# Patient Record
Sex: FEMALE | Race: WHITE | NOT HISPANIC OR LATINO | Employment: FULL TIME | URBAN - METROPOLITAN AREA
[De-identification: names, ages, dates, MRNs, and addresses within clinical notes are randomized per-mention and may not be internally consistent; named-entity substitution may affect disease eponyms.]

---

## 2021-11-02 ENCOUNTER — OFFICE VISIT (OUTPATIENT)
Dept: OBSTETRICS AND GYNECOLOGY | Facility: CLINIC | Age: 31
End: 2021-11-02
Payer: OTHER GOVERNMENT

## 2021-11-02 VITALS — WEIGHT: 132.25 LBS | DIASTOLIC BLOOD PRESSURE: 72 MMHG | SYSTOLIC BLOOD PRESSURE: 120 MMHG

## 2021-11-02 DIAGNOSIS — Z01.419 WELL WOMAN EXAM WITH ROUTINE GYNECOLOGICAL EXAM: Primary | ICD-10-CM

## 2021-11-02 DIAGNOSIS — R00.2 PALPITATIONS: ICD-10-CM

## 2021-11-02 DIAGNOSIS — Z12.4 CERVICAL CANCER SCREENING: ICD-10-CM

## 2021-11-02 PROCEDURE — 99385 PREV VISIT NEW AGE 18-39: CPT | Mod: S$PBB,,, | Performed by: OBSTETRICS & GYNECOLOGY

## 2021-11-02 PROCEDURE — 87624 HPV HI-RISK TYP POOLED RSLT: CPT | Performed by: OBSTETRICS & GYNECOLOGY

## 2021-11-02 PROCEDURE — 99385 PR PREVENTIVE VISIT,NEW,18-39: ICD-10-PCS | Mod: S$PBB,,, | Performed by: OBSTETRICS & GYNECOLOGY

## 2021-11-02 PROCEDURE — 99999 PR PBB SHADOW E&M-NEW PATIENT-LVL III: CPT | Mod: PBBFAC,,, | Performed by: OBSTETRICS & GYNECOLOGY

## 2021-11-02 PROCEDURE — 99203 OFFICE O/P NEW LOW 30 MIN: CPT | Mod: PBBFAC | Performed by: OBSTETRICS & GYNECOLOGY

## 2021-11-02 PROCEDURE — 88175 CYTOPATH C/V AUTO FLUID REDO: CPT | Performed by: OBSTETRICS & GYNECOLOGY

## 2021-11-02 PROCEDURE — 99999 PR PBB SHADOW E&M-NEW PATIENT-LVL III: ICD-10-PCS | Mod: PBBFAC,,, | Performed by: OBSTETRICS & GYNECOLOGY

## 2021-11-02 RX ORDER — LEVONORGESTREL AND ETHINYL ESTRADIOL 0.15-0.03
1 KIT ORAL DAILY
COMMUNITY
End: 2021-11-02 | Stop reason: SDUPTHER

## 2021-11-02 RX ORDER — LEVONORGESTREL AND ETHINYL ESTRADIOL 0.15-0.03
1 KIT ORAL DAILY
COMMUNITY
Start: 2021-08-09 | End: 2022-02-23

## 2021-11-02 RX ORDER — DEXTROMETHORPHAN POLISTIREX 30 MG/5ML
60 SUSPENSION ORAL
COMMUNITY
End: 2021-11-02

## 2021-11-03 ENCOUNTER — HOSPITAL ENCOUNTER (OUTPATIENT)
Dept: CARDIOLOGY | Facility: HOSPITAL | Age: 31
Discharge: HOME | End: 2021-11-03
Attending: OBSTETRICS & GYNECOLOGY
Payer: OTHER GOVERNMENT

## 2021-11-03 DIAGNOSIS — R00.2 PALPITATIONS: ICD-10-CM

## 2021-11-03 PROCEDURE — 93010 EKG 12-LEAD: ICD-10-PCS | Mod: ,,, | Performed by: INTERNAL MEDICINE

## 2021-11-03 PROCEDURE — 93005 ELECTROCARDIOGRAM TRACING: CPT

## 2021-11-03 PROCEDURE — 93010 ELECTROCARDIOGRAM REPORT: CPT | Mod: ,,, | Performed by: INTERNAL MEDICINE

## 2021-11-05 ENCOUNTER — OFFICE VISIT (OUTPATIENT)
Dept: CARDIOLOGY | Facility: CLINIC | Age: 31
End: 2021-11-05
Payer: OTHER GOVERNMENT

## 2021-11-05 VITALS
HEIGHT: 65 IN | BODY MASS INDEX: 22.03 KG/M2 | DIASTOLIC BLOOD PRESSURE: 78 MMHG | WEIGHT: 132.25 LBS | HEART RATE: 76 BPM | SYSTOLIC BLOOD PRESSURE: 118 MMHG | OXYGEN SATURATION: 99 %

## 2021-11-05 DIAGNOSIS — R00.2 PALPITATIONS: ICD-10-CM

## 2021-11-05 DIAGNOSIS — R06.02 SHORTNESS OF BREATH: Primary | ICD-10-CM

## 2021-11-05 PROCEDURE — 99213 OFFICE O/P EST LOW 20 MIN: CPT | Mod: PBBFAC | Performed by: INTERNAL MEDICINE

## 2021-11-05 PROCEDURE — 99999 PR PBB SHADOW E&M-EST. PATIENT-LVL III: ICD-10-PCS | Mod: PBBFAC,,, | Performed by: INTERNAL MEDICINE

## 2021-11-05 PROCEDURE — 99203 PR OFFICE/OUTPT VISIT, NEW, LEVL III, 30-44 MIN: ICD-10-PCS | Mod: S$PBB,,, | Performed by: INTERNAL MEDICINE

## 2021-11-05 PROCEDURE — 99203 OFFICE O/P NEW LOW 30 MIN: CPT | Mod: S$PBB,,, | Performed by: INTERNAL MEDICINE

## 2021-11-05 PROCEDURE — 99999 PR PBB SHADOW E&M-EST. PATIENT-LVL III: CPT | Mod: PBBFAC,,, | Performed by: INTERNAL MEDICINE

## 2021-11-09 LAB
FINAL PATHOLOGIC DIAGNOSIS: NORMAL
Lab: NORMAL

## 2021-11-11 ENCOUNTER — LAB VISIT (OUTPATIENT)
Dept: LAB | Facility: HOSPITAL | Age: 31
End: 2021-11-11
Attending: INTERNAL MEDICINE
Payer: OTHER GOVERNMENT

## 2021-11-11 DIAGNOSIS — R06.02 SHORTNESS OF BREATH: ICD-10-CM

## 2021-11-11 DIAGNOSIS — R00.2 PALPITATIONS: ICD-10-CM

## 2021-11-11 LAB
ALBUMIN SERPL BCP-MCNC: 3.9 G/DL (ref 3.5–5.2)
ALP SERPL-CCNC: 50 U/L (ref 55–135)
ALT SERPL W/O P-5'-P-CCNC: 18 U/L (ref 10–44)
ANION GAP SERPL CALC-SCNC: 10 MMOL/L (ref 8–16)
AST SERPL-CCNC: 18 U/L (ref 10–40)
BASOPHILS # BLD AUTO: 0.04 K/UL (ref 0–0.2)
BASOPHILS NFR BLD: 0.8 % (ref 0–1.9)
BILIRUB SERPL-MCNC: 0.6 MG/DL (ref 0.1–1)
BUN SERPL-MCNC: 12 MG/DL (ref 6–20)
CALCIUM SERPL-MCNC: 9.6 MG/DL (ref 8.7–10.5)
CHLORIDE SERPL-SCNC: 106 MMOL/L (ref 95–110)
CHOLEST SERPL-MCNC: 177 MG/DL (ref 120–199)
CHOLEST/HDLC SERPL: 3.6 {RATIO} (ref 2–5)
CO2 SERPL-SCNC: 24 MMOL/L (ref 23–29)
CREAT SERPL-MCNC: 0.8 MG/DL (ref 0.5–1.4)
DIFFERENTIAL METHOD: NORMAL
EOSINOPHIL # BLD AUTO: 0.1 K/UL (ref 0–0.5)
EOSINOPHIL NFR BLD: 1.1 % (ref 0–8)
ERYTHROCYTE [DISTWIDTH] IN BLOOD BY AUTOMATED COUNT: 11.9 % (ref 11.5–14.5)
EST. GFR  (AFRICAN AMERICAN): >60 ML/MIN/1.73 M^2
EST. GFR  (NON AFRICAN AMERICAN): >60 ML/MIN/1.73 M^2
GLUCOSE SERPL-MCNC: 81 MG/DL (ref 70–110)
HCT VFR BLD AUTO: 39.6 % (ref 37–48.5)
HDLC SERPL-MCNC: 49 MG/DL (ref 40–75)
HDLC SERPL: 27.7 % (ref 20–50)
HGB BLD-MCNC: 13.1 G/DL (ref 12–16)
IMM GRANULOCYTES # BLD AUTO: 0.01 K/UL (ref 0–0.04)
IMM GRANULOCYTES NFR BLD AUTO: 0.2 % (ref 0–0.5)
LDLC SERPL CALC-MCNC: 108.8 MG/DL (ref 63–159)
LYMPHOCYTES # BLD AUTO: 1.9 K/UL (ref 1–4.8)
LYMPHOCYTES NFR BLD: 39.6 % (ref 18–48)
MCH RBC QN AUTO: 30.3 PG (ref 27–31)
MCHC RBC AUTO-ENTMCNC: 33.1 G/DL (ref 32–36)
MCV RBC AUTO: 92 FL (ref 82–98)
MONOCYTES # BLD AUTO: 0.5 K/UL (ref 0.3–1)
MONOCYTES NFR BLD: 9.7 % (ref 4–15)
NEUTROPHILS # BLD AUTO: 2.3 K/UL (ref 1.8–7.7)
NEUTROPHILS NFR BLD: 48.6 % (ref 38–73)
NONHDLC SERPL-MCNC: 128 MG/DL
NRBC BLD-RTO: 0 /100 WBC
PLATELET # BLD AUTO: 273 K/UL (ref 150–450)
PMV BLD AUTO: 10.5 FL (ref 9.2–12.9)
POTASSIUM SERPL-SCNC: 4.2 MMOL/L (ref 3.5–5.1)
PROT SERPL-MCNC: 7.1 G/DL (ref 6–8.4)
RBC # BLD AUTO: 4.33 M/UL (ref 4–5.4)
SODIUM SERPL-SCNC: 140 MMOL/L (ref 136–145)
TRIGL SERPL-MCNC: 96 MG/DL (ref 30–150)
TSH SERPL DL<=0.005 MIU/L-ACNC: 3.29 UIU/ML (ref 0.4–4)
WBC # BLD AUTO: 4.75 K/UL (ref 3.9–12.7)

## 2021-11-11 PROCEDURE — 85025 COMPLETE CBC W/AUTO DIFF WBC: CPT | Performed by: INTERNAL MEDICINE

## 2021-11-11 PROCEDURE — 80061 LIPID PANEL: CPT | Performed by: INTERNAL MEDICINE

## 2021-11-11 PROCEDURE — 80053 COMPREHEN METABOLIC PANEL: CPT | Performed by: INTERNAL MEDICINE

## 2021-11-11 PROCEDURE — 36415 COLL VENOUS BLD VENIPUNCTURE: CPT | Performed by: INTERNAL MEDICINE

## 2021-11-11 PROCEDURE — 84443 ASSAY THYROID STIM HORMONE: CPT | Performed by: INTERNAL MEDICINE

## 2021-11-12 ENCOUNTER — TELEPHONE (OUTPATIENT)
Dept: CARDIOLOGY | Facility: CLINIC | Age: 31
End: 2021-11-12
Payer: OTHER GOVERNMENT

## 2021-11-12 LAB
HPV HR 12 DNA SPEC QL NAA+PROBE: NEGATIVE
HPV16 AG SPEC QL: NEGATIVE
HPV18 DNA SPEC QL NAA+PROBE: NEGATIVE

## 2021-11-17 ENCOUNTER — HOSPITAL ENCOUNTER (OUTPATIENT)
Dept: CARDIOLOGY | Facility: HOSPITAL | Age: 31
Discharge: HOME | End: 2021-11-17
Attending: INTERNAL MEDICINE
Payer: OTHER GOVERNMENT

## 2021-11-17 VITALS
HEIGHT: 65 IN | SYSTOLIC BLOOD PRESSURE: 124 MMHG | BODY MASS INDEX: 21.99 KG/M2 | WEIGHT: 132 LBS | DIASTOLIC BLOOD PRESSURE: 80 MMHG | HEART RATE: 76 BPM

## 2021-11-17 VITALS
HEART RATE: 78 BPM | HEIGHT: 65 IN | BODY MASS INDEX: 21.99 KG/M2 | WEIGHT: 132 LBS | DIASTOLIC BLOOD PRESSURE: 78 MMHG | SYSTOLIC BLOOD PRESSURE: 118 MMHG

## 2021-11-17 DIAGNOSIS — R00.2 PALPITATIONS: ICD-10-CM

## 2021-11-17 DIAGNOSIS — R06.02 SHORTNESS OF BREATH: ICD-10-CM

## 2021-11-17 PROCEDURE — 93306 TTE W/DOPPLER COMPLETE: CPT | Mod: 26,,, | Performed by: STUDENT IN AN ORGANIZED HEALTH CARE EDUCATION/TRAINING PROGRAM

## 2021-11-17 PROCEDURE — 93227 XTRNL ECG REC<48 HR R&I: CPT | Mod: ,,, | Performed by: INTERNAL MEDICINE

## 2021-11-17 PROCEDURE — 93226 XTRNL ECG REC<48 HR SCAN A/R: CPT

## 2021-11-17 PROCEDURE — 93306 ECHO (CUPID ONLY): ICD-10-PCS | Mod: 26,,, | Performed by: STUDENT IN AN ORGANIZED HEALTH CARE EDUCATION/TRAINING PROGRAM

## 2021-11-17 PROCEDURE — 93017 CV STRESS TEST TRACING ONLY: CPT

## 2021-11-17 PROCEDURE — 93016 CV STRESS TEST SUPVJ ONLY: CPT | Mod: ,,, | Performed by: STUDENT IN AN ORGANIZED HEALTH CARE EDUCATION/TRAINING PROGRAM

## 2021-11-17 PROCEDURE — 93016 EXERCISE STRESS - EKG (CUPID ONLY): ICD-10-PCS | Mod: ,,, | Performed by: STUDENT IN AN ORGANIZED HEALTH CARE EDUCATION/TRAINING PROGRAM

## 2021-11-17 PROCEDURE — 93018 EXERCISE STRESS - EKG (CUPID ONLY): ICD-10-PCS | Mod: ,,, | Performed by: STUDENT IN AN ORGANIZED HEALTH CARE EDUCATION/TRAINING PROGRAM

## 2021-11-17 PROCEDURE — 93018 CV STRESS TEST I&R ONLY: CPT | Mod: ,,, | Performed by: STUDENT IN AN ORGANIZED HEALTH CARE EDUCATION/TRAINING PROGRAM

## 2021-11-17 PROCEDURE — 93306 TTE W/DOPPLER COMPLETE: CPT

## 2021-11-17 PROCEDURE — 93227 HOLTER MONITOR - 24 HOUR (CUPID ONLY): ICD-10-PCS | Mod: ,,, | Performed by: INTERNAL MEDICINE

## 2021-11-18 LAB
AORTIC ROOT ANNULUS: 2.49 CM
ASCENDING AORTA: 2.87 CM
AV INDEX (PROSTH): 0.73
AV MEAN GRADIENT: 3 MMHG
AV PEAK GRADIENT: 5 MMHG
AV VALVE AREA: 2.7 CM2
AV VELOCITY RATIO: 0.75
BSA FOR ECHO PROCEDURE: 1.66 M2
CV ECHO LV RWT: 0.31 CM
DOP CALC AO PEAK VEL: 1.14 M/S
DOP CALC AO VTI: 24 CM
DOP CALC LVOT AREA: 3.7 CM2
DOP CALC LVOT DIAMETER: 2.18 CM
DOP CALC LVOT PEAK VEL: 0.86 M/S
DOP CALC LVOT STROKE VOLUME: 64.91 CM3
DOP CALC RVOT PEAK VEL: 0.99 M/S
DOP CALC RVOT VTI: 22.1 CM
DOP CALCLVOT PEAK VEL VTI: 17.4 CM
E WAVE DECELERATION TIME: 148.97 MSEC
E/A RATIO: 1.27
E/E' RATIO: 7.12 M/S
ECHO EF ESTIMATED: 70 %
ECHO LV POSTERIOR WALL: 0.73 CM (ref 0.6–1.1)
EJECTION FRACTION: 60 %
FRACTIONAL SHORTENING: 40 % (ref 28–44)
INTERVENTRICULAR SEPTUM: 0.6 CM (ref 0.6–1.1)
IVC DIAMETER: 1.68 CM
IVRT: 65.65 MSEC
LA MAJOR: 4.17 CM
LA WIDTH: 2.63 CM
LEFT ATRIUM SIZE: 3.12 CM
LEFT ATRIUM VOLUME INDEX MOD: 4.8 ML/M2
LEFT ATRIUM VOLUME MOD: 7.99 CM3
LEFT INTERNAL DIMENSION IN SYSTOLE: 2.82 CM (ref 2.1–4)
LEFT VENTRICLE DIASTOLIC VOLUME INDEX: 61.13 ML/M2
LEFT VENTRICLE DIASTOLIC VOLUME: 101.47 ML
LEFT VENTRICLE MASS INDEX: 58 G/M2
LEFT VENTRICLE SYSTOLIC VOLUME INDEX: 18.1 ML/M2
LEFT VENTRICLE SYSTOLIC VOLUME: 30.03 ML
LEFT VENTRICULAR INTERNAL DIMENSION IN DIASTOLE: 4.68 CM (ref 3.5–6)
LEFT VENTRICULAR MASS: 95.93 G
LV LATERAL E/E' RATIO: 6.85 M/S
LV SEPTAL E/E' RATIO: 7.42 M/S
LVOT MG: 1.5 MMHG
LVOT MV: 0.57 CM/S
MV PEAK A VEL: 0.7 M/S
MV PEAK E VEL: 0.89 M/S
PISA TR MAX VEL: 2.14 M/S
PULM VEIN S/D RATIO: 1.13
PV MEAN GRADIENT: 2.35 MMHG
PV PEAK D VEL: 0.61 M/S
PV PEAK S VEL: 0.69 M/S
PV PEAK VELOCITY: 1.05 CM/S
RA MAJOR: 4.2 CM
RA PRESSURE: 8 MMHG
RA WIDTH: 3.1 CM
RIGHT VENTRICULAR END-DIASTOLIC DIMENSION: 3.43 CM
SINUS: 2.8 CM
STJ: 2.81 CM
TDI LATERAL: 0.13 M/S
TDI SEPTAL: 0.12 M/S
TDI: 0.13 M/S
TR MAX PG: 18 MMHG
TRICUSPID ANNULAR PLANE SYSTOLIC EXCURSION: 1.82 CM
TV REST PULMONARY ARTERY PRESSURE: 26 MMHG

## 2021-11-19 LAB
CV STRESS BASE HR: 76 BPM
DIASTOLIC BLOOD PRESSURE: 80 MMHG
OHS CV CPX 1 MINUTE RECOVERY HEART RATE: 136 BPM
OHS CV CPX 85 PERCENT MAX PREDICTED HEART RATE MALE: 150
OHS CV CPX ESTIMATED METS: 7
OHS CV CPX MAX PREDICTED HEART RATE: 177
OHS CV CPX PATIENT IS FEMALE: 1
OHS CV CPX PATIENT IS MALE: 0
OHS CV CPX PEAK DIASTOLIC BLOOD PRESSURE: 70 MMHG
OHS CV CPX PEAK HEAR RATE: 164 BPM
OHS CV CPX PEAK RATE PRESSURE PRODUCT: NORMAL
OHS CV CPX PEAK SYSTOLIC BLOOD PRESSURE: 155 MMHG
OHS CV CPX PERCENT MAX PREDICTED HEART RATE ACHIEVED: 93
OHS CV CPX RATE PRESSURE PRODUCT PRESENTING: 9424
STRESS ECHO POST EXERCISE DUR MIN: 7 MINUTES
STRESS ECHO POST EXERCISE DUR SEC: 0 SECONDS
SYSTOLIC BLOOD PRESSURE: 124 MMHG

## 2021-11-21 LAB
OHS CV EVENT MONITOR DAY: 1
OHS CV HOLTER LENGTH DECIMAL HOURS: 48
OHS CV HOLTER LENGTH HOURS: 24
OHS CV HOLTER LENGTH MINUTES: 0
OHS CV HOLTER SINUS AVERAGE HR: 82
OHS CV HOLTER SINUS MAX HR: 135
OHS CV HOLTER SINUS MIN HR: 57

## 2021-11-22 ENCOUNTER — TELEPHONE (OUTPATIENT)
Dept: CARDIOLOGY | Facility: CLINIC | Age: 31
End: 2021-11-22
Payer: OTHER GOVERNMENT

## 2021-11-22 DIAGNOSIS — R06.02 SHORTNESS OF BREATH: Primary | ICD-10-CM

## 2022-01-05 ENCOUNTER — LAB VISIT (OUTPATIENT)
Dept: PRIMARY CARE CLINIC | Facility: OTHER | Age: 32
End: 2022-01-05
Attending: INTERNAL MEDICINE

## 2022-01-05 ENCOUNTER — PATIENT MESSAGE (OUTPATIENT)
Dept: ADMINISTRATIVE | Facility: OTHER | Age: 32
End: 2022-01-05
Payer: OTHER GOVERNMENT

## 2022-01-05 DIAGNOSIS — R05.9 COUGH: ICD-10-CM

## 2022-01-05 DIAGNOSIS — R50.9 FEVER: ICD-10-CM

## 2022-01-05 PROCEDURE — U0003 INFECTIOUS AGENT DETECTION BY NUCLEIC ACID (DNA OR RNA); SEVERE ACUTE RESPIRATORY SYNDROME CORONAVIRUS 2 (SARS-COV-2) (CORONAVIRUS DISEASE [COVID-19]), AMPLIFIED PROBE TECHNIQUE, MAKING USE OF HIGH THROUGHPUT TECHNOLOGIES AS DESCRIBED BY CMS-2020-01-R: HCPCS | Performed by: INTERNAL MEDICINE

## 2022-01-06 ENCOUNTER — PATIENT MESSAGE (OUTPATIENT)
Dept: ADMINISTRATIVE | Facility: OTHER | Age: 32
End: 2022-01-06
Payer: OTHER GOVERNMENT

## 2022-01-08 LAB — SARS-COV-2 RNA RESP QL NAA+PROBE: DETECTED

## 2022-01-10 ENCOUNTER — PATIENT MESSAGE (OUTPATIENT)
Dept: OBSTETRICS AND GYNECOLOGY | Facility: CLINIC | Age: 32
End: 2022-01-10
Payer: OTHER GOVERNMENT

## 2022-02-23 ENCOUNTER — LAB VISIT (OUTPATIENT)
Dept: LAB | Facility: HOSPITAL | Age: 32
End: 2022-02-23
Attending: NURSE PRACTITIONER
Payer: OTHER GOVERNMENT

## 2022-02-23 ENCOUNTER — OFFICE VISIT (OUTPATIENT)
Dept: OBSTETRICS AND GYNECOLOGY | Facility: CLINIC | Age: 32
End: 2022-02-23
Payer: OTHER GOVERNMENT

## 2022-02-23 VITALS
BODY MASS INDEX: 22.89 KG/M2 | SYSTOLIC BLOOD PRESSURE: 118 MMHG | DIASTOLIC BLOOD PRESSURE: 68 MMHG | WEIGHT: 137.56 LBS

## 2022-02-23 DIAGNOSIS — Z32.00 POSSIBLE PREGNANCY: ICD-10-CM

## 2022-02-23 DIAGNOSIS — Z32.00 POSSIBLE PREGNANCY: Primary | ICD-10-CM

## 2022-02-23 LAB
ABO + RH BLD: NORMAL
B-HCG UR QL: POSITIVE
BASOPHILS # BLD AUTO: 0.04 K/UL (ref 0–0.2)
BASOPHILS NFR BLD: 0.7 % (ref 0–1.9)
BILIRUB UR QL STRIP: NEGATIVE
BLD GP AB SCN CELLS X3 SERPL QL: NORMAL
CLARITY UR: CLEAR
COLOR UR: ABNORMAL
CTP QC/QA: YES
DIFFERENTIAL METHOD: NORMAL
EOSINOPHIL # BLD AUTO: 0.1 K/UL (ref 0–0.5)
EOSINOPHIL NFR BLD: 1 % (ref 0–8)
ERYTHROCYTE [DISTWIDTH] IN BLOOD BY AUTOMATED COUNT: 12.4 % (ref 11.5–14.5)
GLUCOSE UR QL STRIP: NEGATIVE
HCT VFR BLD AUTO: 38.4 % (ref 37–48.5)
HGB BLD-MCNC: 12.6 G/DL (ref 12–16)
HGB UR QL STRIP: NEGATIVE
IMM GRANULOCYTES # BLD AUTO: 0.02 K/UL (ref 0–0.04)
IMM GRANULOCYTES NFR BLD AUTO: 0.3 % (ref 0–0.5)
KETONES UR QL STRIP: NEGATIVE
LEUKOCYTE ESTERASE UR QL STRIP: ABNORMAL
LYMPHOCYTES # BLD AUTO: 2 K/UL (ref 1–4.8)
LYMPHOCYTES NFR BLD: 33.7 % (ref 18–48)
MCH RBC QN AUTO: 30.8 PG (ref 27–31)
MCHC RBC AUTO-ENTMCNC: 32.8 G/DL (ref 32–36)
MCV RBC AUTO: 94 FL (ref 82–98)
MICROSCOPIC COMMENT: ABNORMAL
MONOCYTES # BLD AUTO: 0.5 K/UL (ref 0.3–1)
MONOCYTES NFR BLD: 8.1 % (ref 4–15)
NEUTROPHILS # BLD AUTO: 3.4 K/UL (ref 1.8–7.7)
NEUTROPHILS NFR BLD: 56.2 % (ref 38–73)
NITRITE UR QL STRIP: NEGATIVE
NRBC BLD-RTO: 0 /100 WBC
PH UR STRIP: 7 [PH] (ref 5–8)
PLATELET # BLD AUTO: 292 K/UL (ref 150–450)
PMV BLD AUTO: 10 FL (ref 9.2–12.9)
PROT UR QL STRIP: NEGATIVE
RBC # BLD AUTO: 4.09 M/UL (ref 4–5.4)
SP GR UR STRIP: <=1.005 (ref 1–1.03)
URN SPEC COLLECT METH UR: ABNORMAL
WBC # BLD AUTO: 5.96 K/UL (ref 3.9–12.7)
WBC #/AREA URNS HPF: 8 /HPF (ref 0–5)

## 2022-02-23 PROCEDURE — 86850 RBC ANTIBODY SCREEN: CPT | Performed by: NURSE PRACTITIONER

## 2022-02-23 PROCEDURE — 86592 SYPHILIS TEST NON-TREP QUAL: CPT | Performed by: NURSE PRACTITIONER

## 2022-02-23 PROCEDURE — 99213 OFFICE O/P EST LOW 20 MIN: CPT | Mod: PBBFAC | Performed by: NURSE PRACTITIONER

## 2022-02-23 PROCEDURE — 99999 PR PBB SHADOW E&M-EST. PATIENT-LVL III: CPT | Mod: PBBFAC,,, | Performed by: NURSE PRACTITIONER

## 2022-02-23 PROCEDURE — 87591 N.GONORRHOEAE DNA AMP PROB: CPT | Performed by: NURSE PRACTITIONER

## 2022-02-23 PROCEDURE — 81025 URINE PREGNANCY TEST: CPT | Mod: PBBFAC | Performed by: NURSE PRACTITIONER

## 2022-02-23 PROCEDURE — 36415 COLL VENOUS BLD VENIPUNCTURE: CPT | Performed by: NURSE PRACTITIONER

## 2022-02-23 PROCEDURE — 86762 RUBELLA ANTIBODY: CPT | Performed by: NURSE PRACTITIONER

## 2022-02-23 PROCEDURE — 87491 CHLMYD TRACH DNA AMP PROBE: CPT | Performed by: NURSE PRACTITIONER

## 2022-02-23 PROCEDURE — 85025 COMPLETE CBC W/AUTO DIFF WBC: CPT | Performed by: NURSE PRACTITIONER

## 2022-02-23 PROCEDURE — 87389 HIV-1 AG W/HIV-1&-2 AB AG IA: CPT | Performed by: NURSE PRACTITIONER

## 2022-02-23 PROCEDURE — 99999 PR PBB SHADOW E&M-EST. PATIENT-LVL III: ICD-10-PCS | Mod: PBBFAC,,, | Performed by: NURSE PRACTITIONER

## 2022-02-23 PROCEDURE — 81000 URINALYSIS NONAUTO W/SCOPE: CPT | Performed by: NURSE PRACTITIONER

## 2022-02-23 PROCEDURE — 80074 ACUTE HEPATITIS PANEL: CPT | Performed by: NURSE PRACTITIONER

## 2022-02-23 NOTE — PROGRESS NOTES
CHIEF COMPLAINT:   Patient presents with      initial OB        HISTORY OF PRESENT ILLNESS  Arely Shelton 31 y.o.  presents for initial OB  The patient has no complaints today.  No nausea or vomiting. No bleeding or pain.  Pregnancy was planned.  Partner is supportive of pregnancy.  Lives at home with .  1 dog in the home.  Works at [employer].  Denies domestic abuse.  Denies chemical/pesticide/radiation exposure.    OB history:  OB History    Para Term  AB Living   0 0 0 0 0 0   SAB IAB Ectopic Multiple Live Births   0 0 0 0 0         LMP: Patient's last menstrual period was 01/15/2022.  EDC: Estimated Date of Delivery: 10/22/22  EGA: 5w 4d      Health Maintenance   Topic Date Due    Hepatitis C Screening  Never done    TETANUS VACCINE  Never done    Lipid Panel  Completed       Past Medical History:   Diagnosis Date    Allergies     Palpitations        Past Surgical History:   Procedure Laterality Date    WISDOM TOOTH EXTRACTION      WRIST SURGERY         History reviewed. No pertinent family history.    Social History     Socioeconomic History    Marital status:    Tobacco Use    Smoking status: Never Smoker    Smokeless tobacco: Never Used   Substance and Sexual Activity    Alcohol use: Yes    Drug use: Never    Sexual activity: Yes     Partners: Male     Birth control/protection: OCP       Current Outpatient Medications   Medication Sig Dispense Refill    multivitamin capsule Take 1 capsule by mouth once daily.       No current facility-administered medications for this visit.       Review of patient's allergies indicates:   Allergen Reactions    Bactrim [sulfamethoxazole-trimethoprim] Rash         PHYSICAL EXAM   Vitals:    22 1544   BP: 118/68        PAIN SCALE: 0/10 None    REVIEW OF SYSTEMS:    GENERAL: No fever, chills, fatigability or weight loss.  CV: Denies chest pain  PULM: Denies shortness of breath or wheezing.  ABDOMEN: Appetite fine.  No weight loss. Denies diarrhea, abdominal pain, hematemesis or blood in stool.  URINARY: No flank pain, dysuria or hematuria.  REPRODUCTIVE: No abnormal vaginal bleeding.     PHYSICAL EXAM:     APPEARANCE: Well nourished, well developed, in no acute distress  CHEST: Clear to auscultation bilaterally  CV: Regular rate and rhythm  BREASTS: Symmetrical, no skin changes or visible lesions. No palpable masses, nipple discharge or adenopathy bilaterally.  ABDOMEN: Soft. No tenderness or masses. No hepatosplenomegaly. No hernias  PELVIC:   VULVA: No lesions. Normal female genitalia.  URETHRAL MEATUS: Normal size and location, no lesions, no prolapse.  URETHRA: No masses, tenderness, prolapse or scarring.  VAGINA: Moist and well rugated, no discharge, no significant cystocele or rectocele.  CERVIX: No lesions, normal diameter, no stenosis, no cervical motion tenderness.   UTERUS: 6 week size, regular shape, mobile, non-tender, normal position, good support.  ADNEXA: No masses, tenderness or CDS nodularity.  ANUS PERINEUM: No lesions, no relaxation, no external hemorrhoids.     UPT +    ASSESSMENT AND PLAN:    Diagnoses and all orders for this visit:    Possible pregnancy  -     POCT urine pregnancy  -     C. trachomatis/N. gonorrhoeae by AMP DNA  -     CBC Auto Differential; Future  -     Hepatitis Panel, Acute; Future  -     US OB/GYN Procedure ([facility]); Future  -     HIV 1/2 Ag/Ab (4th Gen); Future  -     Urinalysis, Reflex to Urine Culture Urine, Clean Catch  -     Type & Screen; Future  -     Rubella Antibody, IgG; Future  -     RPR; Future       -      Patient was counseled today on A.C.S. Pap guidelines and recommendations for yearly pelvic exams, mammograms and monthly self breast exams; to see her PCP for other health maintenance and pregnancy.   -      Patient's medications and medical history reviewed with patient and implications in pregnancy.   -      Pregnancy course discussed.  -      Patient was counseled  on exercise in pregnancy and proper weight gain based on the [place] [employer]'s recommendations based on her pre-pregnancy weight.     -      BMI     Discussed IOM recommended weight gain of:     Underweight  Less than 18.5 28-40      Normal Weight 18.5-24.9  25-35      Overweight  25-29.9  15-25      Obese    30 and greater  11-20       Discussed criteria for delivery at [facility] r/t excessive pre-preg weight or excessive weight gain:     Pre-pregnancy BMI over 40 or excess pregnancy weight gain defined as:     Pre-preg BMI < 18.5;   Excess weight gain = > 60 pound     Pre-preg BMI 18.5-24.9;   Excess weight gain = > 53 pounds     Pre-preg BMI 25-29.9;   Excess weight gain = > 38 pounds     Pre-preg BMI > 30;    Excess weight gain = > 30 pounds    -      Discussed foods to avoid in pregnancy (i.e. sushi, fish that are high in mercury, deli meat, and unpasteurized cheeses).   -      Discussed prenatal vitamin options (i.e. stool softener, DHA).  -      Discussed potential medical problems in pregnancy.  -      Discussed risk of Toxoplasmosis transmission from pets and reviewed risk reduction techniques.  -      Pt was counseled on travel recommendations and on risks of Zika virus exposure.  Current CDC Zika advisories and prevention techniques were reviewed with pt.  Pt denies any recent international travel and does not plan travel during pregnancy.  Pt reports that partner does not plan travel either.  -      Covid prevention reviewed with patient.  -      Oriented to practice including CNM collaboration.   -      Follow-up routine OB labs and u/s in 2 weeks.

## 2022-02-24 ENCOUNTER — TELEPHONE (OUTPATIENT)
Dept: OBSTETRICS AND GYNECOLOGY | Facility: CLINIC | Age: 32
End: 2022-02-24
Payer: OTHER GOVERNMENT

## 2022-02-24 NOTE — TELEPHONE ENCOUNTER
----- Message from Razia Guillen sent at 2/24/2022  8:57 AM CST -----  Regarding: Appt  Contact: [phone]  Patient Arely calling. Patient would like to change her appt from 3/14 to 3/7 - 3/11 in the pm. Please call patient at [phone]      Thank You

## 2022-02-25 LAB
HAV IGM SERPL QL IA: NEGATIVE
HBV CORE IGM SERPL QL IA: NEGATIVE
HBV SURFACE AG SERPL QL IA: NEGATIVE
HCV AB SERPL QL IA: NEGATIVE
HIV 1+2 AB+HIV1 P24 AG SERPL QL IA: NEGATIVE
RPR SER QL: NORMAL
RUBV IGG SER-ACNC: 27.8 IU/ML
RUBV IGG SER-IMP: REACTIVE

## 2022-02-27 LAB
C TRACH DNA SPEC QL NAA+PROBE: NOT DETECTED
N GONORRHOEA DNA SPEC QL NAA+PROBE: NOT DETECTED

## 2022-03-08 ENCOUNTER — PROCEDURE VISIT (OUTPATIENT)
Dept: OBSTETRICS AND GYNECOLOGY | Facility: CLINIC | Age: 32
End: 2022-03-08
Payer: OTHER GOVERNMENT

## 2022-03-08 VITALS
BODY MASS INDEX: 22.71 KG/M2 | WEIGHT: 136.44 LBS | SYSTOLIC BLOOD PRESSURE: 111 MMHG | DIASTOLIC BLOOD PRESSURE: 76 MMHG

## 2022-03-08 DIAGNOSIS — Z34.01 ENCOUNTER FOR SUPERVISION OF NORMAL FIRST PREGNANCY IN FIRST TRIMESTER: ICD-10-CM

## 2022-03-08 DIAGNOSIS — O21.9 NAUSEA AND VOMITING IN PREGNANCY: Primary | ICD-10-CM

## 2022-03-08 DIAGNOSIS — Z32.00 POSSIBLE PREGNANCY: ICD-10-CM

## 2022-03-08 PROCEDURE — 99213 OFFICE O/P EST LOW 20 MIN: CPT | Mod: PBBFAC | Performed by: ADVANCED PRACTICE MIDWIFE

## 2022-03-08 PROCEDURE — 76817 TRANSVAGINAL US OBSTETRIC: CPT | Mod: PBBFAC | Performed by: OBSTETRICS & GYNECOLOGY

## 2022-03-08 PROCEDURE — 99999 PR PBB SHADOW E&M-EST. PATIENT-LVL III: CPT | Mod: PBBFAC,,, | Performed by: ADVANCED PRACTICE MIDWIFE

## 2022-03-08 PROCEDURE — 99999 PR PBB SHADOW E&M-EST. PATIENT-LVL III: ICD-10-PCS | Mod: PBBFAC,,, | Performed by: ADVANCED PRACTICE MIDWIFE

## 2022-03-08 PROCEDURE — 76817 US OB/GYN PROCEDURE (VIEWPOINT): ICD-10-PCS | Mod: 26,S$PBB,, | Performed by: OBSTETRICS & GYNECOLOGY

## 2022-03-08 RX ORDER — ONDANSETRON 8 MG/1
8 TABLET, ORALLY DISINTEGRATING ORAL 2 TIMES DAILY
Qty: 30 TABLET | Refills: 3 | Status: SHIPPED | OUTPATIENT
Start: 2022-03-08 | End: 2022-05-04

## 2022-03-08 NOTE — PROGRESS NOTES
Oriented to our practice, STEW/MD collaborative care.  Instructed to start prenatal vitamin.   Blue bag info reviewed.  Dietary recommendations made. Constipated and requesting what medications to take.  Pt advised to start a stool softener and miralax as needed and to maintain hydration.    Pt reports some mild nausea, rx sent to pharmacy for zofran.    US reviewed: single viable iup, fhr 132, uterus, cervix and adnexae appear normal, due date: 10/25/22   ALISTAIR Durand

## 2022-03-21 ENCOUNTER — PATIENT MESSAGE (OUTPATIENT)
Dept: OBSTETRICS AND GYNECOLOGY | Facility: CLINIC | Age: 32
End: 2022-03-21
Payer: OTHER GOVERNMENT

## 2022-04-08 ENCOUNTER — ROUTINE PRENATAL (OUTPATIENT)
Dept: OBSTETRICS AND GYNECOLOGY | Facility: CLINIC | Age: 32
End: 2022-04-08
Payer: OTHER GOVERNMENT

## 2022-04-08 VITALS
SYSTOLIC BLOOD PRESSURE: 106 MMHG | BODY MASS INDEX: 23.19 KG/M2 | WEIGHT: 139.31 LBS | DIASTOLIC BLOOD PRESSURE: 72 MMHG

## 2022-04-08 DIAGNOSIS — Z34.01 ENCOUNTER FOR SUPERVISION OF NORMAL FIRST PREGNANCY IN FIRST TRIMESTER: Primary | ICD-10-CM

## 2022-04-08 PROCEDURE — 99999 PR PBB SHADOW E&M-EST. PATIENT-LVL III: ICD-10-PCS | Mod: PBBFAC,,, | Performed by: ADVANCED PRACTICE MIDWIFE

## 2022-04-08 PROCEDURE — 99999 PR PBB SHADOW E&M-EST. PATIENT-LVL III: CPT | Mod: PBBFAC,,, | Performed by: ADVANCED PRACTICE MIDWIFE

## 2022-04-08 PROCEDURE — 99213 OFFICE O/P EST LOW 20 MIN: CPT | Mod: PBBFAC | Performed by: ADVANCED PRACTICE MIDWIFE

## 2022-04-08 PROCEDURE — 0502F SUBSEQUENT PRENATAL CARE: CPT | Mod: ,,, | Performed by: ADVANCED PRACTICE MIDWIFE

## 2022-04-08 PROCEDURE — 0502F PR SUBSEQUENT PRENATAL CARE: ICD-10-PCS | Mod: ,,, | Performed by: ADVANCED PRACTICE MIDWIFE

## 2022-04-08 NOTE — PROGRESS NOTES
31 y.o. female  at 11w3d   denies VB or cramping    Doing well without concerns   First trimester s/s improving:     TWG: 3 lbs   Reviewed prenatal labs  Genetic testing considering maternity 21 will check with insurance      Reviewed warning signs, pregnancy precautions and how/when to call.  RTC x 4 wks, call or present sooner prn.     I spent a total of 20 minutes on the day of the visit.This includes face to face time and non-face to face time preparing to see the patient (eg, review of tests), Obtaining and/or reviewing separately obtained history, Documenting clinical information in the electronic or other health record, Independently interpreting resultsand communicating results to the patient/family/caregiver, or Care coordination.

## 2022-04-08 NOTE — PATIENT INSTRUCTIONS
Patient Education       Pregnancy - The Third Month   About this topic   It is important for you to learn how to take care of yourself to help you have a healthy baby and safe delivery. It is good to have health care throughout your pregnancy.  The third month of your pregnancy starts around week 10 and lasts through week 13. By knowing how far along you are, you can learn what is normal for this stage of your pregnancy and plan for what is next.  General   Your Body   During the third month of your pregnancy, here are some things you can expect.  You may:  Have less morning sickness or upset stomach. This is because the placenta has taken over some of the hormone production for the baby.  Start to gain weight. It is normal to gain about 1 to 3 pounds (.5 to 1.5 kg) in your first 3 months. Most moms gain about 25 to 35 pounds (11 to 16 kg) during their pregnancy. Talk to your doctor about how much weight you should gain.  Have glowing skin because of extra blood flow and hormones  Notice a dark line on your belly. This is normal. You may also be able to feel your uterus in your belly as it starts to grow.  Have more trouble sleeping at night  Have an increase in appetite  Have trouble breathing or have an increase in congestion  Have trouble with bowel movements  Be at a higher risk for getting a yeast infection because of the change in pH of your vagina  Have frequent mood swings.  Your babys growth and development:  Your baby's organs are formed and are starting to work together. Eyelids are closed and will stay that way to protect their eyes as they grow.  Their bones are growing. Your baby is able to open and close their mouth and starts to suck their thumb.  Your baby's genitals and reproductive organs are developing, but it is too soon to tell if your baby is a boy or girl with an ultrasound.  The heartbeat is easy to hear with a special tool at the doctors office.  Your baby is about 3 inches (8 cm) long  and weighs about 1 ounce (30 gm). Your baby is about the size of a lemon.  Things to Think About   Avoid alcohol, drugs, tobacco products, and second hand smoke  Check with your doctor before taking any kind of drugs. Continue to take your vitamin with folic acid.  Eat small meals and drink more water to help with heartburn. Also go for a walk after eating and avoid spicy, fried foods.  You may need to switch to another size or style of clothes as your baby grows. Be comfortable and know that the baby is well cushioned inside of you.  Stay healthy by eating good foods, exercising, and getting enough rest.  When do I need to call the doctor?   Not gaining any weight or losing weight  Belly pain or cramps that keeps you from eating or sleeping  Continuing to have too much upset stomach and throwing up  Period-like bleeding  Where can I learn more?   American Academy of Family Physicians  https://familydoctor.org/changes-in-your-body-during-pregnancy-first-trimester/   Better Health  https://www.betterhealth.[place].gov.[place]/health/HealthyLiving/pregnancy-stages-and-changes   Last Reviewed Date   2020-04-20  Consumer Information Use and Disclaimer   This information is not specific medical advice and does not replace information you receive from your health care provider. This is only a brief summary of general information. It does NOT include all information about conditions, illnesses, injuries, tests, procedures, treatments, therapies, discharge instructions or life-style choices that may apply to you. You must talk with your health care provider for complete information about your health and treatment options. This information should not be used to decide whether or not to accept your health care providers advice, instructions or recommendations. Only your health care provider has the knowledge and training to provide advice that is right for you.  Copyright   Copyright © 2021 [employer] and its affiliates and/or  licensors. All rights reserved.

## 2022-04-12 ENCOUNTER — PATIENT MESSAGE (OUTPATIENT)
Dept: ADMINISTRATIVE | Facility: OTHER | Age: 32
End: 2022-04-12
Payer: OTHER GOVERNMENT

## 2022-04-19 ENCOUNTER — PATIENT MESSAGE (OUTPATIENT)
Dept: ADMINISTRATIVE | Facility: OTHER | Age: 32
End: 2022-04-19
Payer: OTHER GOVERNMENT

## 2022-04-20 ENCOUNTER — PATIENT MESSAGE (OUTPATIENT)
Dept: OBSTETRICS AND GYNECOLOGY | Facility: CLINIC | Age: 32
End: 2022-04-20
Payer: OTHER GOVERNMENT

## 2022-05-04 ENCOUNTER — ROUTINE PRENATAL (OUTPATIENT)
Dept: OBSTETRICS AND GYNECOLOGY | Facility: CLINIC | Age: 32
End: 2022-05-04
Payer: OTHER GOVERNMENT

## 2022-05-04 VITALS
DIASTOLIC BLOOD PRESSURE: 70 MMHG | BODY MASS INDEX: 23.28 KG/M2 | WEIGHT: 139.88 LBS | SYSTOLIC BLOOD PRESSURE: 128 MMHG

## 2022-05-04 DIAGNOSIS — Z34.02 ENCOUNTER FOR SUPERVISION OF NORMAL FIRST PREGNANCY IN SECOND TRIMESTER: Primary | ICD-10-CM

## 2022-05-04 PROCEDURE — 0502F PR SUBSEQUENT PRENATAL CARE: ICD-10-PCS | Mod: ,,, | Performed by: ADVANCED PRACTICE MIDWIFE

## 2022-05-04 PROCEDURE — 0502F SUBSEQUENT PRENATAL CARE: CPT | Mod: ,,, | Performed by: ADVANCED PRACTICE MIDWIFE

## 2022-05-04 PROCEDURE — 99999 PR PBB SHADOW E&M-EST. PATIENT-LVL III: ICD-10-PCS | Mod: PBBFAC,,, | Performed by: ADVANCED PRACTICE MIDWIFE

## 2022-05-04 PROCEDURE — 99999 PR PBB SHADOW E&M-EST. PATIENT-LVL III: CPT | Mod: PBBFAC,,, | Performed by: ADVANCED PRACTICE MIDWIFE

## 2022-05-04 PROCEDURE — 99213 OFFICE O/P EST LOW 20 MIN: CPT | Mod: PBBFAC,PN | Performed by: ADVANCED PRACTICE MIDWIFE

## 2022-05-04 NOTE — PROGRESS NOTES
32 y.o. female  at 15w1d   Not feeling flutters, denies VB, LOF or cramping    Doing well without concerns     TWG: 3 lbs  -diet discussed, encouraged 2000 calorie diet-will lb  Reviewed prenatal labs  Genetic testing attempting to have maternity 21 discussing with Veronica, also discussed quad screen.  Will let us know how she wishes to proceed  Anatomy scan ordered    Reviewed warning signs, pregnancy precautions and how/when to call.  RTC x 4 wks, call or present sooner prn.     I spent a total of 20 minutes on the day of the visit.This includes face to face time and non-face to face time preparing to see the patient (eg, review of tests), Obtaining and/or reviewing separately obtained history, Documenting clinical information in the electronic or other health record, Independently interpreting resultsand communicating results to the patient/family/caregiver, or Care coordination.

## 2022-05-23 ENCOUNTER — TELEPHONE (OUTPATIENT)
Dept: OBSTETRICS AND GYNECOLOGY | Facility: CLINIC | Age: 32
End: 2022-05-23
Payer: OTHER GOVERNMENT

## 2022-05-23 NOTE — TELEPHONE ENCOUNTER
Called to address concerned that there was no answer.  Left message in voicemail advising her to text and let me know when would be a good time to call her so that we can make plans when he see if and smooth moving forward such as paperwork, timing and location of test

## 2022-05-23 NOTE — TELEPHONE ENCOUNTER
Called patient and she received call from [facility] and will be available after 2:15.  Will send message to provider.

## 2022-05-23 NOTE — TELEPHONE ENCOUNTER
----- Message from Nikki Cevallos sent at 5/23/2022 12:07 PM CDT -----  Regarding: rtn call  Contact: pt  Pt returning call to office. Pt can be reached at [phone]

## 2022-05-26 ENCOUNTER — PATIENT MESSAGE (OUTPATIENT)
Dept: OBSTETRICS AND GYNECOLOGY | Facility: CLINIC | Age: 32
End: 2022-05-26
Payer: OTHER GOVERNMENT

## 2022-05-26 DIAGNOSIS — Z34.02 ENCOUNTER FOR SUPERVISION OF NORMAL FIRST PREGNANCY IN SECOND TRIMESTER: Primary | ICD-10-CM

## 2022-05-26 NOTE — TELEPHONE ENCOUNTER
Spoke with patient, wants to do maternity 21.  Orders will be sent to [facility] and patient scheduled for 5/30.

## 2022-05-30 ENCOUNTER — TELEPHONE (OUTPATIENT)
Dept: OBSTETRICS AND GYNECOLOGY | Facility: CLINIC | Age: 32
End: 2022-05-30
Payer: OTHER GOVERNMENT

## 2022-05-30 ENCOUNTER — LAB VISIT (OUTPATIENT)
Dept: LAB | Facility: HOSPITAL | Age: 32
End: 2022-05-30
Payer: OTHER GOVERNMENT

## 2022-05-30 DIAGNOSIS — Z34.02 ENCOUNTER FOR SUPERVISION OF NORMAL FIRST PREGNANCY IN SECOND TRIMESTER: ICD-10-CM

## 2022-05-30 PROCEDURE — 36415 COLL VENOUS BLD VENIPUNCTURE: CPT | Performed by: OBSTETRICS & GYNECOLOGY

## 2022-05-30 NOTE — TELEPHONE ENCOUNTER
Call patient to address concern, she is already on the way to the [facility] to get her blood work done.  States paperwork is ready for her on the 2nd floor.  Advised that I will watch out for results and let her have them as soon as they are available

## 2022-06-02 ENCOUNTER — TELEPHONE (OUTPATIENT)
Dept: OBSTETRICS AND GYNECOLOGY | Facility: CLINIC | Age: 32
End: 2022-06-02
Payer: OTHER GOVERNMENT

## 2022-06-02 NOTE — TELEPHONE ENCOUNTER
Mrs. Funes from lab [id number] called and stated that pts paperwork was not filled out properly and that she needed another form faxed or emailed to her so they could run the test. I informed her we would write a new order and have it faxed over today.

## 2022-06-03 ENCOUNTER — PROCEDURE VISIT (OUTPATIENT)
Dept: OBSTETRICS AND GYNECOLOGY | Facility: CLINIC | Age: 32
End: 2022-06-03
Payer: OTHER GOVERNMENT

## 2022-06-03 VITALS — DIASTOLIC BLOOD PRESSURE: 68 MMHG | SYSTOLIC BLOOD PRESSURE: 108 MMHG | WEIGHT: 145.5 LBS | BODY MASS INDEX: 24.21 KG/M2

## 2022-06-03 DIAGNOSIS — Z34.02 ENCOUNTER FOR SUPERVISION OF NORMAL FIRST PREGNANCY IN SECOND TRIMESTER: Primary | ICD-10-CM

## 2022-06-03 DIAGNOSIS — Z34.02 ENCOUNTER FOR SUPERVISION OF NORMAL FIRST PREGNANCY IN SECOND TRIMESTER: ICD-10-CM

## 2022-06-03 PROCEDURE — 99213 OFFICE O/P EST LOW 20 MIN: CPT | Mod: PBBFAC | Performed by: ADVANCED PRACTICE MIDWIFE

## 2022-06-03 PROCEDURE — 76805 US OB/GYN PROCEDURE (VIEWPOINT): ICD-10-PCS | Mod: 26,S$PBB,, | Performed by: OBSTETRICS & GYNECOLOGY

## 2022-06-03 PROCEDURE — 99999 PR PBB SHADOW E&M-EST. PATIENT-LVL III: ICD-10-PCS | Mod: PBBFAC,,, | Performed by: ADVANCED PRACTICE MIDWIFE

## 2022-06-03 PROCEDURE — 0502F SUBSEQUENT PRENATAL CARE: CPT | Mod: ,,, | Performed by: ADVANCED PRACTICE MIDWIFE

## 2022-06-03 PROCEDURE — 0502F PR SUBSEQUENT PRENATAL CARE: ICD-10-PCS | Mod: ,,, | Performed by: ADVANCED PRACTICE MIDWIFE

## 2022-06-03 PROCEDURE — 99999 PR PBB SHADOW E&M-EST. PATIENT-LVL III: CPT | Mod: PBBFAC,,, | Performed by: ADVANCED PRACTICE MIDWIFE

## 2022-06-03 PROCEDURE — 76805 OB US >/= 14 WKS SNGL FETUS: CPT | Mod: PBBFAC | Performed by: OBSTETRICS & GYNECOLOGY

## 2022-06-03 RX ORDER — OMEGA-3S/DHA/EPA/FISH OIL 1000-1400
CAPSULE,DELAYED RELEASE (ENTERIC COATED) ORAL
COMMUNITY
Start: 2022-05-01 | End: 2022-10-21 | Stop reason: ALTCHOICE

## 2022-06-03 NOTE — PATIENT INSTRUCTIONS
Patient Education       Pregnancy - The Fourth Month   About this topic   It is important for you to learn how to take care of yourself to help you have a healthy baby and safe delivery. It is good to have health care throughout your pregnancy.  The fourth month of your pregnancy starts around week 14 and lasts through week 18. By knowing how far along you are, you can learn what is normal for this stage of your pregnancy and plan for what is next.  General   Growth and Development   During the fourth month of your pregnancy, here are some things you can expect.  You may:  Start to show that you are pregnant. It is normal to gain about 5 to 10 pounds (2.3 to 4.5 kg) total in your first 4 months.  Have heartburn  Feel like you have trouble paying attention to things  Have less nausea  Notice your breasts are growing and the veins are easier to see on them  Have swollen veins in your legs and feet, more nosebleeds, or bleeding when you brush your teeth. These are all because of the extra blood your body has while you are pregnant.  Notice more swelling in your hands and feet  Start to feel fluttering when you are lying or sitting quietly. This is your baby kicking.  Have pain in your sides with sudden movement. This is normal and happens because the ligaments in your belly are stretching.  Have a little more energy. Exercise is good for you, but check with your doctor before starting new exercises.  Most of the time it is safe for you to have sex while you are pregnant. It wont hurt the baby.  Your babys:  Skin is very thin and you can easily see blood vessels through it. Your baby is covered with lots of fine hair to protect their skin.  Bones are starting to harden. Your baby is able to frown, smile, stretch, and move.  Practicing breathing movements while inside of your womb  About 6 inches (16 cm) long and weighs about 7 ounces (200 gm). Your baby is about the size of an orange.  Things to Think About   Avoid  alcohol, drugs, tobacco products, and second hand smoke  Check with your doctor before taking any kind of drugs. Continue to take your vitamin with folic acid.  Avoid cleaning cat litter boxes. This can cause a disease that causes birth defects in your baby.  Amniocentesis and other prenatal screening tests may be done this month.  Try sleeping on your side. Use a pillow between your legs. Avoid sleeping on your back. This will help with the blood flow to your baby.  Change positions and get up slowly. Your heart has to work hard to cope with all of the extra blood volume.  Where will you take your baby for care after they are born? This is a good time to find a doctor for your baby.  Eat fresh fruits and foods with a lot of fiber to help with hard stools.  Drink at least 6 to 8 glasses of water each day.  When do I need to call the doctor?   Vaginal bleeding  Leaking of fluid from your vagina  Problems with constipation  Belly pain  Any illness or infection  Severe headaches or headaches that wont go away  Where can I learn more?   Better Health  https://www.betterhealth.[place].gov.au/health/HealthyLiving/pregnancy-stages-and-changes   Family Doctor  https://familydoctor.org/changes-in-your-body-during-pregnancy-first-trimester/   Last Reviewed Date   2020-04-20  Consumer Information Use and Disclaimer   This information is not specific medical advice and does not replace information you receive from your health care provider. This is only a brief summary of general information. It does NOT include all information about conditions, illnesses, injuries, tests, procedures, treatments, therapies, discharge instructions or life-style choices that may apply to you. You must talk with your health care provider for complete information about your health and treatment options. This information should not be used to decide whether or not to accept your health care providers advice, instructions or recommendations. Only your  health care provider has the knowledge and training to provide advice that is right for you.  Copyright   Copyright © 2021 [employer] and its affiliates and/or licensors. All rights reserved.

## 2022-06-03 NOTE — PROGRESS NOTES
32 y.o. female  at 19w3d   Is feeling flutters/FM, denies VB, LOF or cramping  Doing well without concerns   TW lbs   Reviewed anatomy US-vertex, anterior placenta, three-vessel cord, normal fluid, normal male anatomy-reassuring  Plans epidural, breastfeeding and no circumcision  Would like to meters many of midwives in the practice as possible and appointment schedule accordingly    Reviewed warning signs, normal FM,  labor precautions and how/when to call.  RTC x 4 wks, call or present sooner prn.     I spent a total of 20 minutes on the day of the visit.This includes face to face time and non-face to face time preparing to see the patient (eg, review of tests), Obtaining and/or reviewing separately obtained history, Documenting clinical information in the electronic or other health record, Independently interpreting resultsand communicating results to the patient/family/caregiver, or Care coordination.

## 2022-06-06 ENCOUNTER — PATIENT MESSAGE (OUTPATIENT)
Dept: OBSTETRICS AND GYNECOLOGY | Facility: CLINIC | Age: 32
End: 2022-06-06
Payer: OTHER GOVERNMENT

## 2022-06-30 ENCOUNTER — PATIENT MESSAGE (OUTPATIENT)
Dept: OBSTETRICS AND GYNECOLOGY | Facility: CLINIC | Age: 32
End: 2022-06-30

## 2022-06-30 ENCOUNTER — ROUTINE PRENATAL (OUTPATIENT)
Dept: OBSTETRICS AND GYNECOLOGY | Facility: CLINIC | Age: 32
End: 2022-06-30
Payer: OTHER GOVERNMENT

## 2022-06-30 VITALS
BODY MASS INDEX: 25.64 KG/M2 | DIASTOLIC BLOOD PRESSURE: 64 MMHG | SYSTOLIC BLOOD PRESSURE: 120 MMHG | WEIGHT: 154.13 LBS

## 2022-06-30 DIAGNOSIS — Z3A.23 23 WEEKS GESTATION OF PREGNANCY: ICD-10-CM

## 2022-06-30 DIAGNOSIS — Z34.02 NORMAL FIRST PREGNANCY IN SECOND TRIMESTER: Primary | ICD-10-CM

## 2022-06-30 PROCEDURE — 99999 PR PBB SHADOW E&M-EST. PATIENT-LVL II: ICD-10-PCS | Mod: PBBFAC,,, | Performed by: MIDWIFE

## 2022-06-30 PROCEDURE — 0502F PR SUBSEQUENT PRENATAL CARE: ICD-10-PCS | Mod: ,,, | Performed by: MIDWIFE

## 2022-06-30 PROCEDURE — 0502F SUBSEQUENT PRENATAL CARE: CPT | Mod: ,,, | Performed by: MIDWIFE

## 2022-06-30 PROCEDURE — 99999 PR PBB SHADOW E&M-EST. PATIENT-LVL II: CPT | Mod: PBBFAC,,, | Performed by: MIDWIFE

## 2022-06-30 PROCEDURE — 99212 OFFICE O/P EST SF 10 MIN: CPT | Mod: PBBFAC | Performed by: MIDWIFE

## 2022-06-30 NOTE — PROGRESS NOTES
32 y.o. female  at 23w2d   Reports + FM, denies VB, LOF, or cramping  Doing well without concerns   TW lbs   Breast pump Rx: given  Reviewed upcoming 28wk labs, (O POS) and orders placed, tdap handout provided and explained  Reviewed warning signs, normal FM,  labor precautions and how/when to call.  RTC x 4 wks, call or present sooner prn.

## 2022-07-26 ENCOUNTER — PATIENT MESSAGE (OUTPATIENT)
Dept: OBSTETRICS AND GYNECOLOGY | Facility: CLINIC | Age: 32
End: 2022-07-26
Payer: OTHER GOVERNMENT

## 2022-07-28 ENCOUNTER — ROUTINE PRENATAL (OUTPATIENT)
Dept: OBSTETRICS AND GYNECOLOGY | Facility: CLINIC | Age: 32
End: 2022-07-28
Payer: OTHER GOVERNMENT

## 2022-07-28 ENCOUNTER — LAB VISIT (OUTPATIENT)
Dept: LAB | Facility: HOSPITAL | Age: 32
End: 2022-07-28
Attending: NURSE PRACTITIONER
Payer: OTHER GOVERNMENT

## 2022-07-28 VITALS
BODY MASS INDEX: 26.27 KG/M2 | SYSTOLIC BLOOD PRESSURE: 120 MMHG | WEIGHT: 157.88 LBS | DIASTOLIC BLOOD PRESSURE: 72 MMHG

## 2022-07-28 DIAGNOSIS — Z34.02 NORMAL FIRST PREGNANCY IN SECOND TRIMESTER: ICD-10-CM

## 2022-07-28 DIAGNOSIS — Z3A.27 27 WEEKS GESTATION OF PREGNANCY: Primary | ICD-10-CM

## 2022-07-28 DIAGNOSIS — Z23 ENCOUNTER FOR VACCINATION: ICD-10-CM

## 2022-07-28 LAB
BASOPHILS # BLD AUTO: 0.02 K/UL (ref 0–0.2)
BASOPHILS NFR BLD: 0.2 % (ref 0–1.9)
DIFFERENTIAL METHOD: ABNORMAL
EOSINOPHIL # BLD AUTO: 0.1 K/UL (ref 0–0.5)
EOSINOPHIL NFR BLD: 0.8 % (ref 0–8)
ERYTHROCYTE [DISTWIDTH] IN BLOOD BY AUTOMATED COUNT: 12.7 % (ref 11.5–14.5)
GLUCOSE SERPL-MCNC: 133 MG/DL (ref 70–140)
HCT VFR BLD AUTO: 33.4 % (ref 37–48.5)
HGB BLD-MCNC: 11.3 G/DL (ref 12–16)
IMM GRANULOCYTES # BLD AUTO: 0.17 K/UL (ref 0–0.04)
IMM GRANULOCYTES NFR BLD AUTO: 1.8 % (ref 0–0.5)
LYMPHOCYTES # BLD AUTO: 1.3 K/UL (ref 1–4.8)
LYMPHOCYTES NFR BLD: 14.4 % (ref 18–48)
MCH RBC QN AUTO: 31.5 PG (ref 27–31)
MCHC RBC AUTO-ENTMCNC: 33.8 G/DL (ref 32–36)
MCV RBC AUTO: 93 FL (ref 82–98)
MONOCYTES # BLD AUTO: 0.6 K/UL (ref 0.3–1)
MONOCYTES NFR BLD: 6.5 % (ref 4–15)
NEUTROPHILS # BLD AUTO: 7 K/UL (ref 1.8–7.7)
NEUTROPHILS NFR BLD: 76.3 % (ref 38–73)
NRBC BLD-RTO: 0 /100 WBC
PLATELET # BLD AUTO: 254 K/UL (ref 150–450)
PMV BLD AUTO: 10.2 FL (ref 9.2–12.9)
RBC # BLD AUTO: 3.59 M/UL (ref 4–5.4)
WBC # BLD AUTO: 9.21 K/UL (ref 3.9–12.7)

## 2022-07-28 PROCEDURE — 87389 HIV-1 AG W/HIV-1&-2 AB AG IA: CPT | Performed by: MIDWIFE

## 2022-07-28 PROCEDURE — 82950 GLUCOSE TEST: CPT | Performed by: MIDWIFE

## 2022-07-28 PROCEDURE — 86592 SYPHILIS TEST NON-TREP QUAL: CPT | Performed by: MIDWIFE

## 2022-07-28 PROCEDURE — 0502F SUBSEQUENT PRENATAL CARE: CPT | Mod: ,,,

## 2022-07-28 PROCEDURE — 99212 OFFICE O/P EST SF 10 MIN: CPT | Mod: PBBFAC

## 2022-07-28 PROCEDURE — 36415 COLL VENOUS BLD VENIPUNCTURE: CPT | Performed by: MIDWIFE

## 2022-07-28 PROCEDURE — 85025 COMPLETE CBC W/AUTO DIFF WBC: CPT | Performed by: MIDWIFE

## 2022-07-28 PROCEDURE — 99999 PR PBB SHADOW E&M-EST. PATIENT-LVL II: CPT | Mod: PBBFAC,,,

## 2022-07-28 PROCEDURE — 0502F PR SUBSEQUENT PRENATAL CARE: ICD-10-PCS | Mod: ,,,

## 2022-07-28 PROCEDURE — 90715 TDAP VACCINE 7 YRS/> IM: CPT | Mod: PBBFAC

## 2022-07-28 PROCEDURE — 99999 PR PBB SHADOW E&M-EST. PATIENT-LVL II: ICD-10-PCS | Mod: PBBFAC,,,

## 2022-07-28 NOTE — PROGRESS NOTES
Two patient identifiers verified. Patient notified to wait in clinic 15 minutes after injection, patient verbalized understanding. Tdap administered IM to patient's left deltoid. Patient tolerated well.

## 2022-07-28 NOTE — PROGRESS NOTES
32 y.o. female  at 27w2d   Reports + FM, denies VB, LOF or CTX  Doing well. C/o feeling like she is dehydrated despite drinking enough water. Patient states she was drinking about 96oz of water but felt bad because she was losing to many electrolytes.  Suggested adding Smart Water. Patient verbalized understanding   Patient c/o leg cramps.  Discussed taking magnesium supplements if needed.  Patient verbalized understanding   TW lbs   28wk labs today (O POS)  Tdap today      Reviewed warning signs, normal FKCs,  labor precautions and how/when to call. Patient states understanding  RTC x 2 wks, call or present sooner prn.     I spent a total of 20 minutes on the day of the visit.This includes face to face time and non-face to face time preparing to see the patient (eg, review of tests), Obtaining and/or reviewing separately obtained history, Documenting clinical information in the electronic or other health record, Independently interpreting resultsand communicating results to the patient/family/caregiver, or Care coordination.

## 2022-07-29 ENCOUNTER — PATIENT MESSAGE (OUTPATIENT)
Dept: OBSTETRICS AND GYNECOLOGY | Facility: CLINIC | Age: 32
End: 2022-07-29
Payer: OTHER GOVERNMENT

## 2022-07-29 LAB
HIV 1+2 AB+HIV1 P24 AG SERPL QL IA: NEGATIVE
RPR SER QL: NORMAL

## 2022-08-09 ENCOUNTER — ROUTINE PRENATAL (OUTPATIENT)
Dept: OBSTETRICS AND GYNECOLOGY | Facility: CLINIC | Age: 32
End: 2022-08-09
Payer: OTHER GOVERNMENT

## 2022-08-09 VITALS
BODY MASS INDEX: 26.63 KG/M2 | WEIGHT: 160.06 LBS | DIASTOLIC BLOOD PRESSURE: 72 MMHG | SYSTOLIC BLOOD PRESSURE: 112 MMHG

## 2022-08-09 DIAGNOSIS — Z34.03 ENCOUNTER FOR SUPERVISION OF NORMAL FIRST PREGNANCY IN THIRD TRIMESTER: Primary | ICD-10-CM

## 2022-08-09 PROCEDURE — 99212 OFFICE O/P EST SF 10 MIN: CPT | Mod: PBBFAC | Performed by: ADVANCED PRACTICE MIDWIFE

## 2022-08-09 PROCEDURE — 0502F PR SUBSEQUENT PRENATAL CARE: ICD-10-PCS | Mod: ,,, | Performed by: ADVANCED PRACTICE MIDWIFE

## 2022-08-09 PROCEDURE — 99999 PR PBB SHADOW E&M-EST. PATIENT-LVL II: ICD-10-PCS | Mod: PBBFAC,,, | Performed by: ADVANCED PRACTICE MIDWIFE

## 2022-08-09 PROCEDURE — 99999 PR PBB SHADOW E&M-EST. PATIENT-LVL II: CPT | Mod: PBBFAC,,, | Performed by: ADVANCED PRACTICE MIDWIFE

## 2022-08-09 PROCEDURE — 0502F SUBSEQUENT PRENATAL CARE: CPT | Mod: ,,, | Performed by: ADVANCED PRACTICE MIDWIFE

## 2022-08-09 NOTE — PROGRESS NOTES
32 y.o. female  at 29w0d   Reports + FM, denies VB, LOF or CTX  Heartburn daily with nausea at times. Recommended tums and pepcid. Declines zofran. Small frequent meals throughout the day.  Hip pain when sleeping-recommended pregnancy pillow  Still feeling dehydrated despite drinking appropriate amounts of water. Recommended liquid IV.  TW lbs   Reviewed 28wk lab results (O POS)  No Anemia  Passed GTT  Tdap given last visit    Size slightly larger than dates. Will monitor closely. If persistent next visit will schedule US.    Reviewed warning signs, normal FKCs,  labor precautions and how/when to call.  RTC x 2 wks, call or present sooner prn.     I spent a total of 20 minutes on the day of the visit.This includes face to face time and non-face to face time preparing to see the patient (eg, review of tests), Obtaining and/or reviewing separately obtained history, Documenting clinical information in the electronic or other health record, Independently interpreting resultsand communicating results to the patient/family/caregiver, or Care coordination.

## 2022-08-24 ENCOUNTER — PATIENT MESSAGE (OUTPATIENT)
Dept: OBSTETRICS AND GYNECOLOGY | Facility: CLINIC | Age: 32
End: 2022-08-24

## 2022-08-29 ENCOUNTER — ROUTINE PRENATAL (OUTPATIENT)
Dept: OBSTETRICS AND GYNECOLOGY | Facility: CLINIC | Age: 32
End: 2022-08-29
Payer: OTHER GOVERNMENT

## 2022-08-29 VITALS
DIASTOLIC BLOOD PRESSURE: 80 MMHG | WEIGHT: 171.75 LBS | SYSTOLIC BLOOD PRESSURE: 128 MMHG | BODY MASS INDEX: 28.58 KG/M2

## 2022-08-29 DIAGNOSIS — O26.843 SIGNIFICANT DISCREPANCY BETWEEN UTERINE SIZE AND CLINICAL DATES, ANTEPARTUM, THIRD TRIMESTER: Primary | ICD-10-CM

## 2022-08-29 DIAGNOSIS — Z3A.31 31 WEEKS GESTATION OF PREGNANCY: ICD-10-CM

## 2022-08-29 DIAGNOSIS — Z34.03 ENCOUNTER FOR SUPERVISION OF NORMAL FIRST PREGNANCY IN THIRD TRIMESTER: ICD-10-CM

## 2022-08-29 PROCEDURE — 0502F SUBSEQUENT PRENATAL CARE: CPT | Mod: ,,, | Performed by: ADVANCED PRACTICE MIDWIFE

## 2022-08-29 PROCEDURE — 0502F PR SUBSEQUENT PRENATAL CARE: ICD-10-PCS | Mod: ,,, | Performed by: ADVANCED PRACTICE MIDWIFE

## 2022-08-29 PROCEDURE — 99212 OFFICE O/P EST SF 10 MIN: CPT | Mod: PBBFAC | Performed by: ADVANCED PRACTICE MIDWIFE

## 2022-08-29 PROCEDURE — 99999 PR PBB SHADOW E&M-EST. PATIENT-LVL II: CPT | Mod: PBBFAC,,, | Performed by: ADVANCED PRACTICE MIDWIFE

## 2022-08-29 PROCEDURE — 99999 PR PBB SHADOW E&M-EST. PATIENT-LVL II: ICD-10-PCS | Mod: PBBFAC,,, | Performed by: ADVANCED PRACTICE MIDWIFE

## 2022-08-29 NOTE — PROGRESS NOTES
32 y.o. female  at 31w6d   Reports + FM, denies VB, LOF or CTX  Doing well without concerns   TW lbs   Reviewed 28wk lab results (O POS)  Tdap done    Reviewed warning signs, normal FKCs,  labor precautions and how/when to call.  RTC x 2 wks, call or present sooner prn.     I spent a total of 15 minutes on the day of the visit.This includes face to face time and non-face to face time preparing to see the patient (eg, review of tests), Obtaining and/or reviewing separately obtained history, Documenting clinical information in the electronic or other health record, Independently interpreting resultsand communicating results to the patient/family/caregiver, or Care coordination.

## 2022-08-30 ENCOUNTER — PATIENT MESSAGE (OUTPATIENT)
Dept: ADMINISTRATIVE | Facility: OTHER | Age: 32
End: 2022-08-30

## 2022-09-02 ENCOUNTER — TELEPHONE (OUTPATIENT)
Dept: OBSTETRICS AND GYNECOLOGY | Facility: CLINIC | Age: 32
End: 2022-09-02

## 2022-09-02 NOTE — TELEPHONE ENCOUNTER
Pt called stated she is in [facility] and her water broke. Pt states she would like to travel back to [place] to deliver at [facility] for insurance purposes. Pt states Hospital out there gave her steroids and inj to stop her labor. And would like to know if this is possible. Spoke with Midwife in clinic and she stated she would not advise driving or traveling to [place] in this state because pt could possibly go into labor on the road. Pt stated High risk doctors in hospital she is currently in states it is okay for her to travel. Pt verbalized understanding that we advise against it.

## 2022-09-07 ENCOUNTER — PATIENT MESSAGE (OUTPATIENT)
Dept: OBSTETRICS AND GYNECOLOGY | Facility: CLINIC | Age: 32
End: 2022-09-07

## 2022-09-26 ENCOUNTER — OFFICE VISIT (OUTPATIENT)
Dept: PEDIATRICS | Facility: CLINIC | Age: 32
End: 2022-09-26
Payer: OTHER GOVERNMENT

## 2022-09-26 DIAGNOSIS — Z02.9 ADMINISTRATIVE ENCOUNTER: Primary | ICD-10-CM

## 2022-09-26 PROCEDURE — 99499 NO LOS: ICD-10-PCS | Mod: S$PBB,,, | Performed by: PEDIATRICS

## 2022-09-26 PROCEDURE — 99211 OFF/OP EST MAY X REQ PHY/QHP: CPT | Mod: PBBFAC | Performed by: PEDIATRICS

## 2022-09-26 PROCEDURE — 99499 UNLISTED E&M SERVICE: CPT | Mod: S$PBB,,, | Performed by: PEDIATRICS

## 2022-09-26 PROCEDURE — 99999 PR PBB SHADOW E&M-EST. PATIENT-LVL I: CPT | Mod: PBBFAC,,, | Performed by: PEDIATRICS

## 2022-09-26 PROCEDURE — 99999 PR PBB SHADOW E&M-EST. PATIENT-LVL I: ICD-10-PCS | Mod: PBBFAC,,, | Performed by: PEDIATRICS

## 2023-01-05 ENCOUNTER — TELEPHONE (OUTPATIENT)
Dept: LACTATION | Facility: CLINIC | Age: 33
End: 2023-01-05
Payer: OTHER GOVERNMENT

## 2023-01-05 NOTE — TELEPHONE ENCOUNTER
Lactation notified that mother having pumping questions:    Mother reports that her 4 month old is sleeping through the night for 8-9 hours. She states that over the past 3 nights, she stopped waking up to pump and has now seen a decrease in her milk supply. She states that she pumps about 7 times a day. She collects 150-180 mL with her morning pump and 60-80 mL the rest of the day. Mother reports that she is pumping for 20 minutes. She states that baby is gaining weight appropriately, has at least 6 wet diapers and stools every 3-4 days, which is his normal.    Recommended mother to pump at least 8 times a day to maintain adequate milk supply. Encouraged to pump at least once in the middle of the night. If wanting to stop pumping in the middle of the night, encouraged mother to add a pump session during the day. Informed mother that milk supply is highest in the early morning. Encouraged hands on pumping to optimize breast drainage.     Lactation warmline number given and encouraged to call for any lactation questions or concerns. Mother verbalizes understanding of all education and counseling and denies any further lactation needs or concerns at this time.

## 2023-02-06 ENCOUNTER — OFFICE VISIT (OUTPATIENT)
Dept: INTERNAL MEDICINE | Facility: CLINIC | Age: 33
End: 2023-02-06
Payer: OTHER GOVERNMENT

## 2023-02-06 DIAGNOSIS — J01.10 ACUTE NON-RECURRENT FRONTAL SINUSITIS: Primary | ICD-10-CM

## 2023-02-06 PROCEDURE — 99203 PR OFFICE/OUTPT VISIT, NEW, LEVL III, 30-44 MIN: ICD-10-PCS | Mod: 95,,, | Performed by: FAMILY MEDICINE

## 2023-02-06 PROCEDURE — 99203 OFFICE O/P NEW LOW 30 MIN: CPT | Mod: 95,,, | Performed by: FAMILY MEDICINE

## 2023-02-06 RX ORDER — AMOXICILLIN AND CLAVULANATE POTASSIUM 875; 125 MG/1; MG/1
1 TABLET, FILM COATED ORAL EVERY 12 HOURS
Qty: 20 TABLET | Refills: 0 | Status: SHIPPED | OUTPATIENT
Start: 2023-02-06 | End: 2023-02-16

## 2023-02-06 NOTE — PROGRESS NOTES
Subjective:      Patient ID: Arely Shelton is a 32 y.o. female.    Chief Complaint:   Cold/congestion    Cough  This is a new problem. The current episode started 1 to 4 weeks ago. The problem has been waxing and waning. The problem occurs every few hours. The cough is Non-productive, productive of sputum and productive of purulent sputum. Associated symptoms include chest pain, ear congestion, ear pain, headaches, nasal congestion, postnasal drip, rhinorrhea and a sore throat. Pertinent negatives include no chills, fever, heartburn, hemoptysis, myalgias, rash, shortness of breath, sweats, weight loss or wheezing. The symptoms are aggravated by dust, exercise and lying down. She has tried body position changes and rest for the symptoms. The treatment provided mild relief. Her past medical history is significant for asthma and environmental allergies. There is no history of bronchiectasis, bronchitis, COPD, emphysema or pneumonia.   33 yo unknown to me presents with 2 wks of cough/congestion.  Yesterday actual sinus pain/pressure in head after sweeping.  A lot of cough  No fever/chills  Son had it first.  She is nursing//hasn't taken any OTC meds    The patient location is: Work    The chief complaint leading to consultation is: Sick visit    Visit type: audiovisual    Face to Face time with patient: 10 mins   minutes of total time spent on the encounter, which includes face to face time and non-face to face time preparing to see the patient (eg, review of tests), Obtaining and/or reviewing separately obtained history, Documenting clinical information in the electronic or other health record, Independently interpreting results (not separately reported) and communicating results to the patient/family/caregiver, or Care coordination (not separately reported).         Each patient to whom he or she provides medical services by telemedicine is:  (1) informed of the relationship between the physician and patient  and the respective role of any other health care provider with respect to management of the patient; and (2) notified that he or she may decline to receive medical services by telemedicine and may withdraw from such care at any time.    Notes:      Past Medical History:   Diagnosis Date    Allergies     Anxiety 2017    well-managed    Depression 2015    Result of new environment and spouse deployment. Resolved 6/16    Palpitations      Family History   Problem Relation Age of Onset    Heart failure Father      Past Surgical History:   Procedure Laterality Date    WISDOM TOOTH EXTRACTION      WRIST SURGERY       Social History     Tobacco Use    Smoking status: Never    Smokeless tobacco: Never   Substance Use Topics    Alcohol use: Yes     Alcohol/week: 1.0 standard drink     Types: 1 Drinks containing 0.5 oz of alcohol per week     Comment: 1 since 2/2022    Drug use: Never       There were no vitals taken for this visit.    Review of Systems   Constitutional:  Negative for chills, fever and weight loss.   HENT:  Positive for ear pain, postnasal drip, rhinorrhea and sore throat.    Respiratory:  Positive for cough. Negative for hemoptysis, shortness of breath and wheezing.    Cardiovascular:  Positive for chest pain.   Gastrointestinal:  Negative for heartburn.   Musculoskeletal:  Negative for myalgias.   Skin:  Negative for rash.   Allergic/Immunologic: Positive for environmental allergies.   Neurological:  Positive for headaches.     Objective:     Physical Exam  Constitutional:       General: She is not in acute distress.     Appearance: She is well-developed. She is not diaphoretic.   HENT:      Head: Normocephalic.   Eyes:      Conjunctiva/sclera: Conjunctivae normal.   Pulmonary:      Effort: Pulmonary effort is normal. No tachypnea, accessory muscle usage or respiratory distress.   Musculoskeletal:      Cervical back: Neck supple.   Skin:     Coloration: Skin is not pale.   Neurological:      Mental Status:  She is alert and oriented to person, place, and time.   Psychiatric:         Mood and Affect: Mood normal.         Behavior: Behavior normal.         Thought Content: Thought content normal.         Judgment: Judgment normal.       Lab Results   Component Value Date    WBC 9.21 07/28/2022    HGB 11.3 (L) 07/28/2022    HCT 33.4 (L) 07/28/2022     07/28/2022    CHOL 177 11/11/2021    TRIG 96 11/11/2021    HDL 49 11/11/2021    ALT 18 11/11/2021    AST 18 11/11/2021     11/11/2021    K 4.2 11/11/2021     11/11/2021    CREATININE 0.8 11/11/2021    BUN 12 11/11/2021    CO2 24 11/11/2021    TSH 3.285 11/11/2021       Assessment:     1. Acute non-recurrent frontal sinusitis         Plan:     Acute non-recurrent frontal sinusitis    Other orders  -     amoxicillin-clavulanate 875-125mg (AUGMENTIN) 875-125 mg per tablet; Take 1 tablet by mouth every 12 (twelve) hours. for 10 days  Dispense: 20 tablet; Refill: 0      Start Abx bid x 10 days  Take flonase//robitussin or delsym  F/u PRN

## 2023-02-27 ENCOUNTER — PATIENT MESSAGE (OUTPATIENT)
Dept: CARDIOLOGY | Facility: CLINIC | Age: 33
End: 2023-02-27
Payer: OTHER GOVERNMENT

## 2023-06-23 ENCOUNTER — OFFICE VISIT (OUTPATIENT)
Dept: PRIMARY CARE CLINIC | Facility: CLINIC | Age: 33
End: 2023-06-23
Payer: OTHER GOVERNMENT

## 2023-06-23 DIAGNOSIS — J06.9 ACUTE UPPER RESPIRATORY INFECTION: Primary | ICD-10-CM

## 2023-06-23 PROCEDURE — 99213 OFFICE O/P EST LOW 20 MIN: CPT | Mod: 95,,, | Performed by: NURSE PRACTITIONER

## 2023-06-23 PROCEDURE — 99213 PR OFFICE/OUTPT VISIT, EST, LEVL III, 20-29 MIN: ICD-10-PCS | Mod: 95,,, | Performed by: NURSE PRACTITIONER

## 2023-06-23 RX ORDER — GUAIFENESIN 600 MG/1
600 TABLET, EXTENDED RELEASE ORAL 2 TIMES DAILY
Qty: 20 TABLET | Refills: 0
Start: 2023-06-23 | End: 2023-07-03

## 2023-06-23 RX ORDER — AZITHROMYCIN 250 MG/1
TABLET, FILM COATED ORAL
Qty: 6 TABLET | Refills: 0 | Status: SHIPPED | OUTPATIENT
Start: 2023-06-23 | End: 2023-07-24

## 2023-06-23 RX ORDER — FLUTICASONE PROPIONATE 50 MCG
1 SPRAY, SUSPENSION (ML) NASAL DAILY
Qty: 18.2 ML | Refills: 0 | Status: SHIPPED | OUTPATIENT
Start: 2023-06-23

## 2023-06-23 RX ORDER — PROMETHAZINE HYDROCHLORIDE AND DEXTROMETHORPHAN HYDROBROMIDE 6.25; 15 MG/5ML; MG/5ML
5 SYRUP ORAL EVERY 6 HOURS PRN
Qty: 180 ML | Refills: 0 | Status: SHIPPED | OUTPATIENT
Start: 2023-06-23

## 2023-06-23 RX ORDER — PREDNISONE 20 MG/1
40 TABLET ORAL DAILY
Qty: 10 TABLET | Refills: 0 | Status: SHIPPED | OUTPATIENT
Start: 2023-06-23 | End: 2023-06-28

## 2023-06-23 NOTE — PROGRESS NOTES
Primary Care Telemedicine Note  The patient location is:  Patient Home   The chief complaint leading to consultation is: cold symptoms   Total time spent with patient: 15 minutes     Visit type: Virtual visit with synchronous audio and video  Each patient to whom he or she provides medical services by telemedicine is:  (1) informed of the relationship between the physician and patient and the respective role of any other health care provider with respect to management of the patient; and (2) notified that he or she may decline to receive medical services by telemedicine and may withdraw from such care at any time.      Assessment/Plan:    Problem List Items Addressed This Visit    None  Visit Diagnoses       Acute upper respiratory infection    -  Primary    Relevant Medications    azithromycin (Z-PHILIP) 250 MG tablet    guaiFENesin (MUCINEX) 600 mg 12 hr tablet    predniSONE (DELTASONE) 20 MG tablet    promethazine-dextromethorphan (PROMETHAZINE-DM) 6.25-15 mg/5 mL Syrp     -start antibiotics as prescribed  -recommend Flonase and Mucinex  -supportive care- rest, increase hydration with water, OTC Tylenol/Ibuprofen for pain/fever  -follow up if no improvement in symptoms   -ER precautions for severe or worsening of symptoms       Follow up if symptoms worsen or fail to improve.    Nadia Subramanian, ASHLY    _____________________________________________________________________________________________________________________________________________________    CC: cold symptoms     HPI:    Patient is an established patient who presents today via virtual visit. Upper Respiratory Infection: Patient complains of symptoms of a URI. Symptoms include congestion, chest tightness, cough, rhinorrhea, and diarrhea.  Onset of symptoms was 1 week ago, gradually worsening since that time. She is drinking moderate amounts of fluids. Evaluation to date: none. Treatment to date: none.       Past Medical History:  Past Medical History:    Diagnosis Date    Allergies     Anxiety 2017    well-managed    Depression 2015    Result of new environment and spouse deployment. Resolved 6/16    Palpitations      Past Surgical History:   Procedure Laterality Date    WISDOM TOOTH EXTRACTION      WRIST SURGERY       Review of patient's allergies indicates:   Allergen Reactions    Bactrim [sulfamethoxazole-trimethoprim] Rash     Social History     Tobacco Use    Smoking status: Never    Smokeless tobacco: Never   Substance Use Topics    Alcohol use: Yes     Alcohol/week: 1.0 standard drink     Types: 1 Drinks containing 0.5 oz of alcohol per week     Comment: 1 since 2/2022    Drug use: Never     Family History   Problem Relation Age of Onset    Heart failure Father      Current Outpatient Medications on File Prior to Visit   Medication Sig Dispense Refill    norethindrone (MICRONOR) 0.35 mg tablet Take 1 tablet (0.35 mg total) by mouth once daily. 90 tablet 3    prenatal vitamins no.2 (PRENATAL VITAMIN NO.2 ORAL) Take by mouth.       No current facility-administered medications on file prior to visit.       Review of Systems   Constitutional:  Negative for chills, fever and weight loss.   HENT:  Positive for congestion and sore throat. Negative for ear pain.    Respiratory:  Positive for cough and shortness of breath. Negative for hemoptysis and wheezing.    Cardiovascular:  Positive for chest pain.   Gastrointestinal:  Positive for diarrhea. Negative for heartburn.   Musculoskeletal:  Negative for myalgias.   Skin:  Negative for rash.   Neurological:  Positive for headaches.   Endo/Heme/Allergies:  Positive for environmental allergies.       Physical Exam   @thptenteredbppulse@  @thptenteredglucose@    Physical Exam  Constitutional:       Appearance: She is well-developed.   HENT:      Head: Normocephalic and atraumatic.   Pulmonary:      Effort: Pulmonary effort is normal.   Musculoskeletal:         General: Normal range of motion.      Cervical back: Normal  range of motion.   Neurological:      Mental Status: She is alert and oriented to person, place, and time.   Psychiatric:         Behavior: Behavior normal.         Thought Content: Thought content normal.         Judgment: Judgment normal.       The patient's Health Maintenance was reviewed and the following appears to be due at this time:  Health Maintenance Due   Topic Date Due    COVID-19 Vaccine (1) Never done

## 2023-07-24 ENCOUNTER — OFFICE VISIT (OUTPATIENT)
Dept: INTERNAL MEDICINE | Facility: CLINIC | Age: 33
End: 2023-07-24
Payer: OTHER GOVERNMENT

## 2023-07-24 ENCOUNTER — PATIENT MESSAGE (OUTPATIENT)
Dept: INTERNAL MEDICINE | Facility: CLINIC | Age: 33
End: 2023-07-24

## 2023-07-24 DIAGNOSIS — U07.1 COVID: Primary | ICD-10-CM

## 2023-07-24 PROBLEM — Z34.03 ENCOUNTER FOR SUPERVISION OF NORMAL FIRST PREGNANCY IN THIRD TRIMESTER: Status: RESOLVED | Noted: 2022-03-08 | Resolved: 2023-07-24

## 2023-07-24 PROBLEM — R06.02 SHORTNESS OF BREATH: Status: RESOLVED | Noted: 2021-11-05 | Resolved: 2023-07-24

## 2023-07-24 PROBLEM — R00.2 PALPITATIONS: Status: RESOLVED | Noted: 2021-11-02 | Resolved: 2023-07-24

## 2023-07-24 PROCEDURE — 99213 PR OFFICE/OUTPT VISIT, EST, LEVL III, 20-29 MIN: ICD-10-PCS | Mod: 95,,, | Performed by: FAMILY MEDICINE

## 2023-07-24 PROCEDURE — 99213 OFFICE O/P EST LOW 20 MIN: CPT | Mod: 95,,, | Performed by: FAMILY MEDICINE

## 2023-07-24 NOTE — PROGRESS NOTES
Subjective:       Patient ID: Arely Shelton is a 33 y.o. female.    Chief Complaint: No chief complaint on file.    The patient location is:  Home  The chief complaint leading to consultation is:  COVID, return to work    Visit type: audiovisual    Face to Face time with patient:   10 minutes of total time spent on the encounter, which includes face to face time and non-face to face time preparing to see the patient (eg, review of tests), Obtaining and/or reviewing separately obtained history, Documenting clinical information in the electronic or other health record, Independently interpreting results (not separately reported) and communicating results to the patient/family/caregiver, or Care coordination (not separately reported).         Each patient to whom he or she provides medical services by telemedicine is:  (1) informed of the relationship between the physician and patient and the respective role of any other health care provider with respect to management of the patient; and (2) notified that he or she may decline to receive medical services by telemedicine and may withdraw from such care at any time.    Notes:     33-year-old female patient with no significant past medical history reports that patient has been diagnosed with COVID positive on 07/14/2023 after her  was positive on 07/13/2023, patient took supply of promethazine DM, and has been out of work since then  Denies any fever and has been doing well, denies any cough or congestion or wheezing and would like to have Work excuse to go back to work tomorrow    Cough  This is a new problem. The current episode started in the past 7 days. The problem has been resolved. The problem occurs every few hours. The cough is Non-productive. Associated symptoms include nasal congestion, a sore throat and weight loss. Pertinent negatives include no chest pain, ear congestion, ear pain, headaches, heartburn, hemoptysis, myalgias, postnasal drip,  rash, rhinorrhea, shortness of breath, sweats or wheezing. Nothing aggravates the symptoms. Risk factors for lung disease include animal exposure. She has tried OTC cough suppressant, prescription cough suppressant and rest for the symptoms. The treatment provided significant relief. There is no history of asthma, bronchiectasis, bronchitis, COPD, emphysema, environmental allergies or pneumonia.   Review of Systems   Constitutional:  Positive for weight loss.   HENT:  Positive for sore throat. Negative for ear pain, postnasal drip and rhinorrhea.    Respiratory:  Negative for cough, hemoptysis, chest tightness, shortness of breath and wheezing.    Cardiovascular:  Negative for chest pain.   Gastrointestinal:  Negative for heartburn.   Musculoskeletal:  Negative for myalgias.   Skin:  Negative for rash.   Allergic/Immunologic: Negative for environmental allergies.   Neurological:  Negative for headaches.       There were no vitals taken for this visit.  Objective:      Physical Exam  Neurological:      Mental Status: She is alert and oriented to person, place, and time.   Psychiatric:         Mood and Affect: Mood normal.         Assessment/Plan:   1. COVID  Patient clinically recovered  Reports that she was tested positive at home on 07/14/2023  Currently not taking any antipyretics or cough suppressants at this time and has been feeling better  Patient does okay to go back to work tomorrow  Work excuse will be given

## 2024-06-19 ENCOUNTER — OFFICE VISIT (OUTPATIENT)
Dept: URGENT CARE | Facility: CLINIC | Age: 34
End: 2024-06-19
Payer: OTHER GOVERNMENT

## 2024-06-19 VITALS
SYSTOLIC BLOOD PRESSURE: 105 MMHG | OXYGEN SATURATION: 97 % | WEIGHT: 138.75 LBS | TEMPERATURE: 97 F | RESPIRATION RATE: 16 BRPM | HEART RATE: 88 BPM | BODY MASS INDEX: 25.53 KG/M2 | HEIGHT: 62 IN | DIASTOLIC BLOOD PRESSURE: 69 MMHG

## 2024-06-19 DIAGNOSIS — R53.83 FATIGUE, UNSPECIFIED TYPE: ICD-10-CM

## 2024-06-19 DIAGNOSIS — R11.2 NAUSEA AND VOMITING, UNSPECIFIED VOMITING TYPE: ICD-10-CM

## 2024-06-19 DIAGNOSIS — R19.7 DIARRHEA, UNSPECIFIED TYPE: ICD-10-CM

## 2024-06-19 DIAGNOSIS — K52.9 GASTROENTERITIS: Primary | ICD-10-CM

## 2024-06-19 DIAGNOSIS — R63.0 DECREASED APPETITE: ICD-10-CM

## 2024-06-19 PROCEDURE — 99213 OFFICE O/P EST LOW 20 MIN: CPT | Mod: S$GLB,,, | Performed by: NURSE PRACTITIONER

## 2024-06-19 RX ORDER — ONDANSETRON 8 MG/1
8 TABLET, ORALLY DISINTEGRATING ORAL 2 TIMES DAILY
Qty: 12 TABLET | Refills: 0 | Status: SHIPPED | OUTPATIENT
Start: 2024-06-19

## 2024-06-19 NOTE — PATIENT INSTRUCTIONS
Rest  Hydration/increase fluids  [place] diet: smoothies, soups, toast, banana  Avoid fried/fatty/greasy/heavy meals  Zofran 3 times daily as needed for nausea/vomiting  Immodium OTC as directed for diarrhea  Typical course and duration of illness discussed  Signs and symptoms of worsening discussed  Follow up as needed/with worsening

## 2024-06-19 NOTE — PROGRESS NOTES
"Subjective:      Patient ID: Arely Shelton is a 34 y.o. female.    Vitals:  height is 5' 1.97" (1.574 m) and weight is 62.9 kg (138 lb 12.5 oz). Her tympanic temperature is 97.4 °F (36.3 °C). Her blood pressure is 105/69 and her pulse is 88. Her respiration is 16 and oxygen saturation is 97%.     Chief Complaint: Nausea    34 year old female presents for evaluation of nausea (resolved yesterday but returned today), vomiting (x 1 Monday night), and diarrhea (watery, Monday 1-2 times, 4-6 Tuesday, 4-5 times today).  Symptoms started 2 days ago. Symptom onset Monday. No medications taken for relief. No known sick contacts. Ate Shepherds pie prior to symptoms, was greasy but family did not get sick. Denies bland diet with symptom onset, ate [place] dale last night.     Nausea  This is a new problem. Episode onset: 2 days ago. The problem occurs constantly. The problem has been unchanged. Associated symptoms include abdominal pain ("minimal" intermittent), fatigue, headaches (Monday), myalgias, nausea and vomiting. Pertinent negatives include no chills, congestion, coughing, diaphoresis, fever, sore throat or weakness. Nothing aggravates the symptoms. She has tried nothing for the symptoms.       Constitution: Positive for appetite change and fatigue. Negative for chills, sweating and fever.   HENT: Negative.  Negative for congestion and sore throat.    Neck: neck negative.   Cardiovascular: Negative.    Eyes: Negative.    Respiratory: Negative.  Negative for cough.    Gastrointestinal:  Positive for abdominal pain ("minimal" intermittent), nausea, vomiting and diarrhea.   Endocrine: negative.   Genitourinary: Negative.  Negative for dysuria, frequency and urgency.   Musculoskeletal:  Positive for muscle ache.   Skin: Negative.    Allergic/Immunologic: Negative.    Neurological:  Positive for headaches (Monday).   Hematologic/Lymphatic: Negative.    Psychiatric/Behavioral: Negative.        Objective:     Physical " Exam   Constitutional: She is oriented to person, place, and time. She appears well-developed. She is cooperative.  Non-toxic appearance. She appears ill. No distress. normalawake  HENT:   Head: Normocephalic and atraumatic.   Ears:   Right Ear: Hearing and external ear normal.   Left Ear: Hearing and external ear normal.   Nose: Nose normal.   Mouth/Throat: Mucous membranes are dry. No oropharyngeal exudate, posterior oropharyngeal erythema or tonsillar abscesses. Tonsils are 0 on the right. Tonsils are 0 on the left. No tonsillar exudate.   Eyes: Conjunctivae and lids are normal. Pupils are equal, round, and reactive to light. Right eye exhibits no discharge. Left eye exhibits no discharge. No scleral icterus. Extraocular movement intact   Neck: Trachea normal. Neck supple.   Cardiovascular: Normal rate, regular rhythm and normal heart sounds.   Pulmonary/Chest: Effort normal and breath sounds normal. No stridor. No respiratory distress. She has no decreased breath sounds. She has no wheezes. She has no rhonchi. She has no rales. She exhibits no tenderness.   Abdominal: Normal appearance and bowel sounds are normal. She exhibits no distension and no mass. Soft. There is abdominal tenderness (lower abdomen) in the right lower quadrant and left lower quadrant. There is no rebound, no guarding, no tenderness at McBurney's point, negative Kumari's sign, no left CVA tenderness, negative Rovsing's sign and no right CVA tenderness. No hernia.   Musculoskeletal: Normal range of motion.         General: Normal range of motion.   Neurological: no focal deficit. She is alert and oriented to person, place, and time. She has normal strength.   Skin: Skin is warm, dry, intact, not diaphoretic and not pale.   Psychiatric: Her speech is normal and behavior is normal. Judgment and thought content normal.   Nursing note and vitals reviewed.      Assessment:     1. Gastroenteritis    2. Nausea and vomiting, unspecified vomiting type     3. Diarrhea, unspecified type    4. Fatigue, unspecified type    5. Decreased appetite        Plan:       Gastroenteritis    Nausea and vomiting, unspecified vomiting type  -     ondansetron (ZOFRAN-ODT) 8 MG TbDL; Take 1 tablet (8 mg total) by mouth 2 (two) times daily.  Dispense: 12 tablet; Refill: 0    Diarrhea, unspecified type    Fatigue, unspecified type    Decreased appetite        Patient presents with symptoms and examination that are consistent with gastroenteritis. Exam negative for appendicitis/cholecystitis/UTI/pyelonephritis. Plan is to manage symptoms, prevent worsening, and follow up as needed/with worsening. Discussed with patient who verbalizes understanding.      Patient Instructions   Rest  Hydration/increase fluids  [place] diet: smoothies, soups, toast, banana  Avoid fried/fatty/greasy/heavy meals  Zofran 3 times daily as needed for nausea/vomiting  Immodium OTC as directed for diarrhea  Typical course and duration of illness discussed  Signs and symptoms of worsening discussed  Follow up as needed/with worsening